# Patient Record
Sex: MALE | Race: WHITE | NOT HISPANIC OR LATINO | Employment: FULL TIME | URBAN - METROPOLITAN AREA
[De-identification: names, ages, dates, MRNs, and addresses within clinical notes are randomized per-mention and may not be internally consistent; named-entity substitution may affect disease eponyms.]

---

## 2020-07-22 ENCOUNTER — HOSPITAL ENCOUNTER (OUTPATIENT)
Dept: PERIOP | Facility: HOSPITAL | Age: 40
Setting detail: HOSPITAL OUTPATIENT SURGERY
Discharge: HOME | End: 2020-07-22
Attending: UROLOGY

## 2020-07-22 LAB
ALBUMIN SERPL-MCNC: 4.1 G/DL (ref 3.5–5)
ALBUMIN/GLOB SERPL: 1.3 {RATIO} (ref 1.4–2.6)
ALP SERPL-CCNC: 65 U/L (ref 53–128)
ALT SERPL-CCNC: 55 U/L (ref 10–40)
ANION GAP SERPL CALC-SCNC: 18 MMOL/L (ref 8–19)
APPEARANCE UR: ABNORMAL
AST SERPL-CCNC: 38 U/L (ref 15–50)
BASOPHILS # BLD AUTO: 0.02 10*3/UL (ref 0–0.2)
BASOPHILS NFR BLD AUTO: 0.4 % (ref 0–3)
BILIRUB SERPL-MCNC: 0.47 MG/DL (ref 0.2–1.3)
BILIRUB UR QL: ABNORMAL
BUN SERPL-MCNC: 13 MG/DL (ref 5–25)
BUN/CREAT SERPL: 12 {RATIO} (ref 6–20)
CALCIUM SERPL-MCNC: 9.1 MG/DL (ref 8.7–10.4)
CHLORIDE SERPL-SCNC: 99 MMOL/L (ref 99–111)
COLOR UR: ABNORMAL
CONV ABS IMM GRAN: 0.01 10*3/UL (ref 0–0.2)
CONV BACTERIA: NEGATIVE
CONV CO2: 23 MMOL/L (ref 22–32)
CONV COLLECTION SOURCE (UA): ABNORMAL
CONV HYALINE CASTS IN URINE MICRO: ABNORMAL /[LPF]
CONV IMMATURE GRAN: 0.2 % (ref 0–1.8)
CONV TOTAL PROTEIN: 7.2 G/DL (ref 6.3–8.2)
CONV UROBILINOGEN IN URINE BY AUTOMATED TEST STRIP: 1 {EHRLICHU}/DL (ref 0.1–1)
CREAT UR-MCNC: 1.11 MG/DL (ref 0.7–1.2)
DEPRECATED RDW RBC AUTO: 41.3 FL (ref 35.1–43.9)
EOSINOPHIL # BLD AUTO: 0.09 10*3/UL (ref 0–0.7)
EOSINOPHIL # BLD AUTO: 2 % (ref 0–7)
ERYTHROCYTE [DISTWIDTH] IN BLOOD BY AUTOMATED COUNT: 12.4 % (ref 11.6–14.4)
GFR SERPLBLD BASED ON 1.73 SQ M-ARVRAT: >60 ML/MIN/{1.73_M2}
GLOBULIN UR ELPH-MCNC: 3.1 G/DL (ref 2–3.5)
GLUCOSE SERPL-MCNC: 126 MG/DL (ref 70–99)
GLUCOSE UR QL: NEGATIVE MG/DL
HCT VFR BLD AUTO: 41.9 % (ref 42–52)
HGB BLD-MCNC: 14.6 G/DL (ref 14–18)
HGB UR QL STRIP: ABNORMAL
KETONES UR QL STRIP: NEGATIVE MG/DL
LEUKOCYTE ESTERASE UR QL STRIP: ABNORMAL
LIPASE SERPL-CCNC: 35 U/L (ref 5–51)
LYMPHOCYTES # BLD AUTO: 0.98 10*3/UL (ref 1–5)
LYMPHOCYTES NFR BLD AUTO: 21.9 % (ref 20–45)
MCH RBC QN AUTO: 31.5 PG (ref 27–31)
MCHC RBC AUTO-ENTMCNC: 34.8 G/DL (ref 33–37)
MCV RBC AUTO: 90.3 FL (ref 80–96)
MONOCYTES # BLD AUTO: 0.41 10*3/UL (ref 0.2–1.2)
MONOCYTES NFR BLD AUTO: 9.2 % (ref 3–10)
NEUTROPHILS # BLD AUTO: 2.97 10*3/UL (ref 2–8)
NEUTROPHILS NFR BLD AUTO: 66.3 % (ref 30–85)
NITRITE UR QL STRIP: NEGATIVE
NRBC CBCN: 0 % (ref 0–0.7)
OSMOLALITY SERPL CALC.SUM OF ELEC: 286 MOSM/KG (ref 273–304)
PH UR STRIP.AUTO: 5 [PH] (ref 5–8)
PLATELET # BLD AUTO: 181 10*3/UL (ref 130–400)
PMV BLD AUTO: 11.3 FL (ref 9.4–12.4)
POTASSIUM SERPL-SCNC: 3.4 MMOL/L (ref 3.5–5.3)
PROT UR QL: 100 MG/DL
RBC # BLD AUTO: 4.64 10*6/UL (ref 4.7–6.1)
RBC #/AREA URNS HPF: ABNORMAL /[HPF]
SODIUM SERPL-SCNC: 137 MMOL/L (ref 135–147)
SP GR UR: 1.06 (ref 1–1.03)
WBC # BLD AUTO: 4.48 10*3/UL (ref 4.8–10.8)
WBC #/AREA URNS HPF: ABNORMAL /[HPF]

## 2020-08-11 LAB
COLOR STONE: NORMAL
COMPN STONE: NORMAL
CONV CA OXALATE DIHYDRATE: 40 %
CONV CA OXALATE MONOHYDRATE: 55 %
CONV CALCULI COMMENT: NORMAL
CONV CALCULI DISCLAIMER: NORMAL
CONV CALCULI NOTE: NORMAL
CONV PHOTO (CALCULI): NORMAL
HYDROXYAPATITE: 5 %
SIZE STONE: NORMAL MM
WT STONE: 42 MG

## 2020-08-24 ENCOUNTER — TELEPHONE CONVERTED (OUTPATIENT)
Dept: UROLOGY | Facility: CLINIC | Age: 40
End: 2020-08-24
Attending: UROLOGY

## 2021-04-15 ENCOUNTER — OFFICE VISIT (OUTPATIENT)
Dept: CARDIOLOGY | Facility: CLINIC | Age: 41
End: 2021-04-15

## 2021-04-15 VITALS
DIASTOLIC BLOOD PRESSURE: 78 MMHG | SYSTOLIC BLOOD PRESSURE: 139 MMHG | HEART RATE: 90 BPM | WEIGHT: 255 LBS | BODY MASS INDEX: 38.65 KG/M2 | HEIGHT: 68 IN | OXYGEN SATURATION: 94 %

## 2021-04-15 DIAGNOSIS — R07.2 PRECORDIAL PAIN: Primary | ICD-10-CM

## 2021-04-15 DIAGNOSIS — I10 ESSENTIAL HYPERTENSION: ICD-10-CM

## 2021-04-15 DIAGNOSIS — R06.02 SHORTNESS OF BREATH: ICD-10-CM

## 2021-04-15 PROCEDURE — 99204 OFFICE O/P NEW MOD 45 MIN: CPT | Performed by: PHYSICIAN ASSISTANT

## 2021-04-15 RX ORDER — LIRAGLUTIDE 6 MG/ML
1.2 INJECTION SUBCUTANEOUS DAILY
COMMUNITY
End: 2023-01-05

## 2021-04-15 RX ORDER — DIPHENOXYLATE HYDROCHLORIDE AND ATROPINE SULFATE 2.5; .025 MG/1; MG/1
TABLET ORAL DAILY
COMMUNITY

## 2021-04-15 RX ORDER — ATORVASTATIN CALCIUM 10 MG/1
10 TABLET, FILM COATED ORAL DAILY
COMMUNITY

## 2021-04-15 RX ORDER — HYDROCHLOROTHIAZIDE 25 MG/1
25 TABLET ORAL DAILY
COMMUNITY

## 2021-04-15 RX ORDER — LISINOPRIL 5 MG/1
5 TABLET ORAL DAILY
COMMUNITY

## 2021-04-15 RX ORDER — LIRAGLUTIDE 6 MG/ML
0.6 INJECTION SUBCUTANEOUS DAILY
COMMUNITY
End: 2023-01-05

## 2021-04-15 NOTE — PROGRESS NOTES
Bekah Blue is a 40 y.o. male     Chief Complaint   Patient presents with   • Establish Care     [facility] recent D/C       HPI  The patient presents to the clinic today to establish cardiovascular care.  This very pleasant patient is referred in the setting of chest pain, left upper extremity discomfort, and dyspnea.  He had a recent episode of onset of chest discomfort which he describes as intense precordial chest tightness and aching.  There was associated left upper extremity numbness and pain otherwise.  He initially felt that his chest discomfort was reflux and his left upper extremity discomfort was more radicular from his known cervical disc disease.  Still, with severe chest discomfort and progressively worsening dyspnea throughout the day, the patient eventually went to the emergency room locally.  Cardiac enzymes, laboratories otherwise, telemetry, etc. all were benign.  He was ruled out for MI and eventually discharged and advised to follow-up in the clinic today.  He was started on anxiolytic therapy apparently with his primary care provider.  Since, he tells me that his chest pain has progressively minimized.  Still, because of severity of symptoms previously, he would like to proceed on with cardiac testing just to ensure no significant abnormalities.  Otherwise, the patient has experienced no failure or dysrhythmic symptoms.  Blood pressures appear to be well controlled on current medical regimen.  He has no further complaints otherwise.      Current Outpatient Medications   Medication Sig Dispense Refill   • APPLE CIDER VINEGAR PO Take  by mouth 2 (two) times a day.     • atorvastatin (LIPITOR) 10 MG tablet Take 10 mg by mouth Daily.     • hydroCHLOROthiazide (HYDRODIURIL) 25 MG tablet Take 25 mg by mouth Daily.     • Liraglutide (Victoza) 18 MG/3ML solution pen-injector injection Inject 0.6 mg under the skin into the appropriate area as directed Daily. For the first week then increase to  1.2 mg daily     • Liraglutide (Victoza) 18 MG/3ML solution pen-injector injection Inject 1.2 mg under the skin into the appropriate area as directed Daily.     • lisinopril (PRINIVIL,ZESTRIL) 5 MG tablet Take 5 mg by mouth Daily.     • multivitamin (MULTI-DAY PO) Take  by mouth Daily.     • sertraline (ZOLOFT) 50 MG tablet Take 50 mg by mouth Daily.       No current facility-administered medications for this visit.       Patient has no known allergies.    Past Medical History:   Diagnosis Date   • Hyperlipidemia    • Hypertension        Social History     Socioeconomic History   • Marital status:      Spouse name: Not on file   • Number of children: Not on file   • Years of education: Not on file   • Highest education level: Not on file   Tobacco Use   • Smoking status: Former Smoker   • Smokeless tobacco: Never Used   Substance and Sexual Activity   • Alcohol use: Yes     Comment: social    • Drug use: Never   • Sexual activity: Defer       Family History   Problem Relation Age of Onset   • Hypertension Mother    • Heart disease Mother    • Cancer Father        Review of Systems   Constitutional: Negative.  Negative for chills, fatigue and fever.   HENT: Negative.  Negative for congestion, rhinorrhea and sore throat.    Eyes: Positive for visual disturbance (glasses).   Respiratory: Positive for chest tightness. Negative for shortness of breath and wheezing.    Cardiovascular: Positive for palpitations and leg swelling. Negative for chest pain.   Gastrointestinal: Negative.    Endocrine: Negative.  Negative for cold intolerance.   Genitourinary: Negative.    Musculoskeletal: Positive for back pain and neck pain. Negative for arthralgias.   Skin: Negative.  Negative for rash and wound.   Allergic/Immunologic: Positive for environmental allergies.   Neurological: Positive for numbness (L/ R arm at times ). Negative for dizziness, weakness and headaches.   Hematological: Negative.  Does not bruise/bleed  "easily.   Psychiatric/Behavioral: Positive for sleep disturbance (falling asleep ).       Objective     Vitals:    04/15/21 1504   BP: 139/78   BP Location: Left arm   Patient Position: Sitting   Pulse: 90   SpO2: 94%   Weight: 116 kg (255 lb)   Height: 172.7 cm (68\")        /78 (BP Location: Left arm, Patient Position: Sitting)   Pulse 90   Ht 172.7 cm (68\")   Wt 116 kg (255 lb)   SpO2 94%   BMI 38.77 kg/m²      Lab Results (most recent)     None          Physical Exam  Vitals and nursing note reviewed.   Constitutional:       General: He is not in acute distress.     Appearance: He is well-developed.   HENT:      Head: Normocephalic and atraumatic.   Eyes:      Conjunctiva/sclera: Conjunctivae normal.      Pupils: Pupils are equal, round, and reactive to light.   Neck:      Vascular: No JVD.      Trachea: No tracheal deviation.   Cardiovascular:      Rate and Rhythm: Normal rate and regular rhythm.      Heart sounds: Normal heart sounds.   Pulmonary:      Effort: Pulmonary effort is normal.      Breath sounds: Normal breath sounds.   Abdominal:      General: Bowel sounds are normal. There is no distension.      Palpations: Abdomen is soft. There is no mass.      Tenderness: There is no abdominal tenderness. There is no guarding or rebound.   Musculoskeletal:         General: No tenderness or deformity. Normal range of motion.      Cervical back: Normal range of motion and neck supple.   Skin:     General: Skin is warm and dry.      Coloration: Skin is not pale.      Findings: No erythema or rash.   Neurological:      Mental Status: He is alert and oriented to person, place, and time.   Psychiatric:         Behavior: Behavior normal.         Thought Content: Thought content normal.         Judgment: Judgment normal.         Procedure   Procedures         Assessment/Plan      Diagnosis Plan   1. Precordial pain  Adult Transthoracic Echo Complete W/ Cont if Necessary Per Protocol    Treadmill Stress Test "   2. Shortness of breath  Adult Transthoracic Echo Complete W/ Cont if Necessary Per Protocol    Treadmill Stress Test   3. Essential hypertension  Adult Transthoracic Echo Complete W/ Cont if Necessary Per Protocol    Treadmill Stress Test     1.  The patient overall describes atypical chest discomfort.  He feels that his symptoms have improved since starting anxiolytic therapy.  Irregardless, because of severity and intensity of his chest discomfort, which prompted recent ER evaluation as above, I would like to schedule him for further testing.  I will schedule for regular treadmill stress test for ischemia assessment.    2.  I will schedule for an echocardiogram as well to evaluate him structurally given symptoms as above.    3.  The patient appears to be normotensive for the most part at this time.  I would continue antihypertensive therapy without change.  He will monitor blood pressures closely and call to us for any issues.    4.  We will make no further adjustments in medical regimen.  We will see him back after we have the above studies available.  We can recommend him further at that time.  He will call for any issues prior to follow-up.           Gabino Blue  reports that he has quit smoking. He has never used smokeless tobacco..          Patient's Body mass index is 38.77 kg/m². BMI is above normal parameters. Recommendations include: educational material.           Electronically signed by:

## 2021-04-15 NOTE — PATIENT INSTRUCTIONS

## 2021-05-10 NOTE — H&P
"   History and Physical      Patient Name: Gabino Blue   Patient ID: [id number]   Sex: Male   YOB: 1980        Visit Date: August 24, 2020    Provider: Rj Samano MD   Location: [facility]   Location Address: [address] [place] [address]  [id number]   Location Phone: [phone]          Chief Complaint  · pt here for urologic issues      History Of Present Illness  TELEHEALTH TELEPHONE VISIT  Gabino Blue is a 39 year old male who is presenting for evaluation via telehealth telephone visit. Verbal consent obtained before beginning visit.   Provider spent 13 minutes with the patient during the telehealth visit.   The following staff were present during this visit: Ana Adams   Past Medical History/ Overview of Patient Symptoms     39-year-old gentleman status post ureteroscopy    Asymptomatic.  No gross hematuria    7/22/2020 right ureteroscopy with laser and stent -all stones removed    7/20 calcium oxalate 40%, calcium oxalate monohydrate 55%, hydroxyapatite 5%    7/20 CTno stones on the left.    24 hour urine done in set by another urologist, no medicine was prescribed at that time    Stent is out    3 stones, 2 lithotripsies    No cardiopulmonary history.  Patient does not smoke.  Patient does not use blood thinner.             Past Medical History  Hypertension; Kidney calculus         Past Surgical History  Ear Tubes; Kidney_Surgery; Knee surgery         Medication List  Allergy Medication; hydrochlorothiazide 25 mg oral tablet         Allergy List  NO KNOWN DRUG ALLERGIES         Family Medical History  *No Known Family History         Social History  Tobacco (Never)         Review of Systems  · Constitutional  o Denies  o : chills  · Gastrointestinal  o Denies  o : vomiting      Vitals  Date Time BP Position Site L\R Cuff Size HR RR TEMP (F) WT  HT  BMI kg/m2 BSA m2 O2 Sat HC       08/24/2020 09:56 AM         249lbs 0oz 5'  8\" 37.86 2.33   "             Assessment  · Nephrolithiasis     592.0/N20.0    Problems Reconciled  Plan  · Orders  o Physician Telephone Evaluation, 11-20 minutes (51442) - 592.0/N20.0 - 08/24/2020  · Medications  o Medications have been Reconciled  o Transition of Care or Provider Policy  · Instructions  o Plan Of Care:   o Electronically Identified Patient Education Materials Provided Electronically       The patient was counseled on the preventative measures of kidney stones today.  This included increasing fluid intake to make at least 1.5 ml daily, decreasing meat intake, decreasing salt intake and taking in a normal amount of calcium (1000 mg daily).      Not interested in metabolic stone work-up    Follow-up as needed             Electronically Signed by: Rj Samano MD -Author on August 24, 2020 10:18:53 AM

## 2021-05-14 VITALS — BODY MASS INDEX: 37.74 KG/M2 | HEIGHT: 68 IN | WEIGHT: 249 LBS

## 2021-06-15 ENCOUNTER — HOSPITAL ENCOUNTER (OUTPATIENT)
Dept: CARDIOLOGY | Facility: HOSPITAL | Age: 41
Discharge: HOME | End: 2021-06-15

## 2021-06-15 VITALS — BODY MASS INDEX: 38.76 KG/M2 | HEIGHT: 68 IN | WEIGHT: 255.73 LBS

## 2021-06-15 DIAGNOSIS — R06.02 SHORTNESS OF BREATH: ICD-10-CM

## 2021-06-15 DIAGNOSIS — R07.2 PRECORDIAL PAIN: ICD-10-CM

## 2021-06-15 DIAGNOSIS — I10 ESSENTIAL HYPERTENSION: ICD-10-CM

## 2021-06-15 PROCEDURE — 93017 CV STRESS TEST TRACING ONLY: CPT

## 2021-06-15 PROCEDURE — 93306 TTE W/DOPPLER COMPLETE: CPT

## 2021-06-15 PROCEDURE — 93306 TTE W/DOPPLER COMPLETE: CPT | Performed by: INTERNAL MEDICINE

## 2021-06-15 PROCEDURE — 93018 CV STRESS TEST I&R ONLY: CPT | Performed by: INTERNAL MEDICINE

## 2021-06-20 LAB
BH CV STRESS RECOVERY BP: NORMAL MMHG
BH CV STRESS RECOVERY HR: 98 BPM
MAXIMAL PREDICTED HEART RATE: 180 BPM
PERCENT MAX PREDICTED HR: 92.22 %
STRESS BASELINE BP: NORMAL MMHG
STRESS BASELINE HR: 60 BPM
STRESS PERCENT HR: 108 %
STRESS POST ESTIMATED WORKLOAD: 12 METS
STRESS POST EXERCISE DUR MIN: 10 MIN
STRESS POST EXERCISE DUR SEC: 15 SEC
STRESS POST PEAK BP: NORMAL MMHG
STRESS POST PEAK HR: 166 BPM
STRESS TARGET HR: 153 BPM

## 2021-06-22 ENCOUNTER — TELEPHONE (OUTPATIENT)
Dept: CARDIOLOGY | Facility: CLINIC | Age: 41
End: 2021-06-22

## 2021-06-22 NOTE — TELEPHONE ENCOUNTER
Left VM for patient - stress test     Heart pump function good   Resting heart rate good  No indication of blockages     Keep routine follow up     Patient did report SOB during test - just checking to see how he is doing / if he is having any other ongoing symptoms     Please call with any questions / concerns     AT [facility]        ----- Message from JEN Queen sent at 6/21/2021  8:37 AM EDT -----  Routine follow-up.  Please let me know for ongoing symptoms.

## 2021-06-27 LAB
AORTIC DIMENSIONLESS INDEX: 0.9 (DI)
BH CV ECHO MEAS - ACS: 2 CM
BH CV ECHO MEAS - AO MAX PG (FULL): 1.7 MMHG
BH CV ECHO MEAS - AO MAX PG: 7.4 MMHG
BH CV ECHO MEAS - AO MEAN PG (FULL): 1 MMHG
BH CV ECHO MEAS - AO MEAN PG: 4 MMHG
BH CV ECHO MEAS - AO ROOT AREA (BSA CORRECTED): 1.2
BH CV ECHO MEAS - AO ROOT AREA: 5.9 CM^2
BH CV ECHO MEAS - AO ROOT DIAM: 2.8 CM
BH CV ECHO MEAS - AO V2 MAX: 136 CM/SEC
BH CV ECHO MEAS - AO V2 MEAN: 95 CM/SEC
BH CV ECHO MEAS - AO V2 VTI: 33.5 CM
BH CV ECHO MEAS - BSA(HAYCOCK): 2.4 M^2
BH CV ECHO MEAS - BSA: 2.3 M^2
BH CV ECHO MEAS - BZI_BMI: 38.8 KILOGRAMS/M^2
BH CV ECHO MEAS - BZI_METRIC_HEIGHT: 172.7 CM
BH CV ECHO MEAS - BZI_METRIC_WEIGHT: 115.7 KG
BH CV ECHO MEAS - EDV(CUBED): 137.4 ML
BH CV ECHO MEAS - EDV(TEICH): 127.2 ML
BH CV ECHO MEAS - EF(CUBED): 68.8 %
BH CV ECHO MEAS - EF(TEICH): 60 %
BH CV ECHO MEAS - EF_3D-VOL: 54 %
BH CV ECHO MEAS - ESV(CUBED): 42.9 ML
BH CV ECHO MEAS - ESV(TEICH): 50.9 ML
BH CV ECHO MEAS - FS: 32.2 %
BH CV ECHO MEAS - IVS/LVPW: 0.81
BH CV ECHO MEAS - IVSD: 0.83 CM
BH CV ECHO MEAS - LA A2CS (ATRIAL LENGTH): 5.4 CM
BH CV ECHO MEAS - LA DIMENSION: 3.7 CM
BH CV ECHO MEAS - LA/AO: 1.3
BH CV ECHO MEAS - LAT PEAK E' VEL: 11.9 CM/SEC
BH CV ECHO MEAS - LV IVRT: 0.13 SEC
BH CV ECHO MEAS - LV MASS(C)D: 174.4 GRAMS
BH CV ECHO MEAS - LV MASS(C)DI: 76.9 GRAMS/M^2
BH CV ECHO MEAS - LV MAX PG: 5.7 MMHG
BH CV ECHO MEAS - LV MEAN PG: 3 MMHG
BH CV ECHO MEAS - LV V1 MAX: 119 CM/SEC
BH CV ECHO MEAS - LV V1 MEAN: 74.4 CM/SEC
BH CV ECHO MEAS - LV V1 VTI: 27.2 CM
BH CV ECHO MEAS - LVIDD: 5.2 CM
BH CV ECHO MEAS - LVIDS: 3.5 CM
BH CV ECHO MEAS - LVPWD: 1 CM
BH CV ECHO MEAS - MED PEAK E' VEL: 12.1 CM/SEC
BH CV ECHO MEAS - MV A MAX VEL: 56.4 CM/SEC
BH CV ECHO MEAS - MV DEC SLOPE: 302 CM/SEC^2
BH CV ECHO MEAS - MV DEC TIME: 0.25 SEC
BH CV ECHO MEAS - MV E MAX VEL: 84.8 CM/SEC
BH CV ECHO MEAS - MV E/A: 1.5
BH CV ECHO MEAS - MV MAX PG: 4 MMHG
BH CV ECHO MEAS - MV MEAN PG: 1 MMHG
BH CV ECHO MEAS - MV P1/2T MAX VEL: 81.1 CM/SEC
BH CV ECHO MEAS - MV P1/2T: 78.7 MSEC
BH CV ECHO MEAS - MV V2 MAX: 100 CM/SEC
BH CV ECHO MEAS - MV V2 MEAN: 55.1 CM/SEC
BH CV ECHO MEAS - MV V2 VTI: 42.4 CM
BH CV ECHO MEAS - MVA P1/2T LCG: 2.7 CM^2
BH CV ECHO MEAS - MVA(P1/2T): 2.8 CM^2
BH CV ECHO MEAS - PA MAX PG (FULL): 2.1 MMHG
BH CV ECHO MEAS - PA MAX PG: 3.8 MMHG
BH CV ECHO MEAS - PA MEAN PG (FULL): 1 MMHG
BH CV ECHO MEAS - PA MEAN PG: 2 MMHG
BH CV ECHO MEAS - PA V2 MAX: 97.4 CM/SEC
BH CV ECHO MEAS - PA V2 MEAN: 65.4 CM/SEC
BH CV ECHO MEAS - PA V2 VTI: 22.2 CM
BH CV ECHO MEAS - RAP SYSTOLE: 10 MMHG
BH CV ECHO MEAS - RV MAX PG: 1.7 MMHG
BH CV ECHO MEAS - RV MEAN PG: 1 MMHG
BH CV ECHO MEAS - RV V1 MAX: 65.6 CM/SEC
BH CV ECHO MEAS - RV V1 MEAN: 43.9 CM/SEC
BH CV ECHO MEAS - RV V1 VTI: 17.1 CM
BH CV ECHO MEAS - RVDD: 2.7 CM
BH CV ECHO MEAS - RVSP: 16.3 MMHG
BH CV ECHO MEAS - SI(AO): 87.8 ML/M^2
BH CV ECHO MEAS - SI(CUBED): 41.7 ML/M^2
BH CV ECHO MEAS - SI(TEICH): 33.7 ML/M^2
BH CV ECHO MEAS - SV(AO): 199 ML
BH CV ECHO MEAS - SV(CUBED): 94.5 ML
BH CV ECHO MEAS - SV(TEICH): 76.3 ML
BH CV ECHO MEAS - TAPSE (>1.6): 2.3 CM
BH CV ECHO MEAS - TR MAX VEL: 125 CM/SEC
BH CV ECHO MEASUREMENTS AVERAGE E/E' RATIO: 7.07
LEFT ATRIUM VOLUME INDEX: 19 ML/M2
LEFT ATRIUM VOLUME: 43 CM3
MAXIMAL PREDICTED HEART RATE: 180 BPM
STRESS TARGET HR: 153 BPM

## 2021-06-28 ENCOUNTER — TELEPHONE (OUTPATIENT)
Dept: CARDIOLOGY | Facility: CLINIC | Age: 41
End: 2021-06-28

## 2021-06-28 NOTE — TELEPHONE ENCOUNTER
Patient informed of results and will keep follow up as scheduled. Leeanna Bowie MA      ----- Message from JEN Queen sent at 6/28/2021  8:43 AM EDT -----  Routine follow-up.       Result Text  Borderline technically adequate study felt suitable for interpretation.     1. LV size, function, and wall motion are normal. LV wall thickness is at the upper limits of normal. Visually estimated ejection fraction is 50 to 55%. 3D ejection fraction is 54%. Normal LV diastolic function and filling pressures. Left atrium is at the upper limits of normal size. Right heart chambers are grossly normal. No septal defect or intracavitary mass or thrombus.     2. Valves are morphologically and physiologically normal.     3. No pericardial or great vessel pathology.     4. Pulmonary artery pressures cannot be calculated.

## 2022-11-07 ENCOUNTER — TELEPHONE (OUTPATIENT)
Dept: CARDIOLOGY | Facility: CLINIC | Age: 42
End: 2022-11-07

## 2022-11-07 NOTE — TELEPHONE ENCOUNTER
The PM called the pt numerous times to schedule an appt.  No answer, no return call after LVM.    The PM called and spoke with Serena MCCABE At the pt's PCP office.  He will call the pt and verify if he wants to schedule at our office or transition his care since there was some confusion regarding the referral.

## 2023-01-05 ENCOUNTER — OFFICE VISIT (OUTPATIENT)
Dept: CARDIOLOGY | Facility: CLINIC | Age: 43
End: 2023-01-05
Payer: COMMERCIAL

## 2023-01-05 VITALS
OXYGEN SATURATION: 95 % | BODY MASS INDEX: 37.07 KG/M2 | WEIGHT: 244.6 LBS | DIASTOLIC BLOOD PRESSURE: 82 MMHG | HEIGHT: 68 IN | SYSTOLIC BLOOD PRESSURE: 133 MMHG | HEART RATE: 86 BPM

## 2023-01-05 DIAGNOSIS — R06.83 SNORES: Primary | ICD-10-CM

## 2023-01-05 DIAGNOSIS — I10 ESSENTIAL HYPERTENSION: ICD-10-CM

## 2023-01-05 DIAGNOSIS — R53.83 FATIGUE, UNSPECIFIED TYPE: ICD-10-CM

## 2023-01-05 DIAGNOSIS — R00.2 PALPITATIONS: ICD-10-CM

## 2023-01-05 DIAGNOSIS — R55 SYNCOPE AND COLLAPSE: ICD-10-CM

## 2023-01-05 DIAGNOSIS — R40.0 DAYTIME SLEEPINESS: ICD-10-CM

## 2023-01-05 PROCEDURE — 99214 OFFICE O/P EST MOD 30 MIN: CPT | Performed by: NURSE PRACTITIONER

## 2023-01-05 PROCEDURE — 93000 ELECTROCARDIOGRAM COMPLETE: CPT | Performed by: NURSE PRACTITIONER

## 2023-01-05 RX ORDER — LANOLIN ALCOHOL/MO/W.PET/CERES
400 CREAM (GRAM) TOPICAL DAILY
COMMUNITY

## 2023-01-05 RX ORDER — CYCLOBENZAPRINE HCL 5 MG
5 TABLET ORAL DAILY
COMMUNITY

## 2023-01-05 NOTE — PROGRESS NOTES
Subjective     Gabino Blue is a 42 y.o. male who presents to day for Hypertension, Chronic Venous Insufficiency, and Rapid Heart Rate.    CHIEF COMPLIANT  Chief Complaint   Patient presents with   • Hypertension   • Chronic Venous Insufficiency   • Rapid Heart Rate       Active Problems:  Problem List Items Addressed This Visit    None  Visit Diagnoses     Snores    -  Primary    Relevant Orders    Home Sleep Study    Cardiac Event Monitor    Essential hypertension        Relevant Orders    Cardiac Event Monitor    Syncope and collapse        Relevant Orders    Cardiac Event Monitor    Daytime sleepiness        Relevant Orders    Home Sleep Study    Cardiac Event Monitor    Fatigue, unspecified type        Relevant Orders    Home Sleep Study    Cardiac Event Monitor    Palpitations        Relevant Orders    Cardiac Event Monitor      Problem List :  1.Precordial pain  2.Shortness of breath  3.Essential hypertension    HPI  Hypertension  Associated symptoms include neck pain (C7 C3 injury) and palpitations (racing ). Pertinent negatives include no chest pain or shortness of breath.     Gabino Blue is a 42-year-old male who is being followed up today for chronic arterial hypertension in which he is being treated with lisinopril. Today's blood pressure is controlled at 133/82 mmHg with a heart rate of 86 beats per minute. Also being followed up today for palpitations in which his heart races; he is on no beta blocker therapy.     He also had an episode of syncope that occurred 3 weeks ago due to reported neck injury. The patient's electrocardiogram is comparable to his past electrocardiogram. It is a normal axis. The patient believes that his injury is in C7 and C3. He states that he has been evaluated for this previously. He states that he is able to stretch and then he comes dizzy. This has occurred previously. The patient states that he was told the pinched nerves in his neck cause this. He was told that this  reduces his oxygen, and he is able to feel tingling before he experiences syncope.    The patient has experienced palpitations and racing heart. This has been more recently than 1 year, and he has a log and a monitor. His log shows a heart rate of 200 beats per minute at an occurrence. The patient states that he does not often have a low heart rate. His lowest measurement was 42 beats per minute. His average resting heart rate is 61 to 81 beats per minute.    The patient was infected with influenza approximately 2 weeks ago. The patient states that his monitor alerts him of a racing heart rate, and he is able to feel symptoms as well. His monitor alerted him before, during, and after his illness period. This prompted him to make an appointment. The patient states that he has been infected with COVID-19 4 times.    The patient does not believe the syncope is a cardiac issue. He is able to feel pressure between his shoulders.    The patient will be seen by his primary care provider, DONTE Hayes on 01/10/2023. He will have blood work done.  The patient has diabetes mellitus. The patient states that his blood sugar would lower rather than elevate when utilizing the Dexcom.  He does snore. He states that he does not feel like he sleeps enough with any sleep schedule. He feels restless. He has tried taking multivitamins for this. He does not believe that he wakes up gasping for air. He been told that he stops breathing when he sleeps. He feels fatigue.    The patient states that he has lost weight. The patient utilizes Trulicity 1.5 mg/0.5 mL once weekly. He hopes to lose more weight. His goal weight is 210 pounds. He initially weighed 256 pounds.    The patient takes hydrochlorothiazide. The patient states that he was also taking Zoloft, but he discontinued this on his own. He states that he has been feeling well since discontinuing this medication. He has not been taking it for approximately 2 months.    PRIOR  MEDS  Current Outpatient Medications on File Prior to Visit   Medication Sig Dispense Refill   • atorvastatin (LIPITOR) 10 MG tablet Take 10 mg by mouth Daily.     • cyclobenzaprine (FLEXERIL) 5 MG tablet Take 5 mg by mouth Daily.     • DICLOFENAC PO Take 50 mg by mouth 2 (Two) Times a Day As Needed.     • Dulaglutide 1.5 MG/0.5ML solution pen-injector Inject 1.5 mg under the skin into the appropriate area as directed 1 (One) Time Per Week.     • hydroCHLOROthiazide (HYDRODIURIL) 25 MG tablet Take 25 mg by mouth Daily.     • lisinopril (PRINIVIL,ZESTRIL) 5 MG tablet Take 5 mg by mouth Daily.     • Magnesium Oxide 400 (240 Mg) MG tablet Take 400 mg by mouth Daily.     • multivitamin (THERAGRAN) tablet tablet Take  by mouth Daily.     • [DISCONTINUED] APPLE CIDER VINEGAR PO Take  by mouth 2 (two) times a day.     • [DISCONTINUED] Liraglutide (Victoza) 18 MG/3ML solution pen-injector injection Inject 0.6 mg under the skin into the appropriate area as directed Daily. For the first week then increase to 1.2 mg daily     • [DISCONTINUED] Liraglutide (Victoza) 18 MG/3ML solution pen-injector injection Inject 1.2 mg under the skin into the appropriate area as directed Daily.     • [DISCONTINUED] sertraline (ZOLOFT) 50 MG tablet Take 50 mg by mouth Daily.       No current facility-administered medications on file prior to visit.       ALLERGIES  Patient has no known allergies.    HISTORY  Past Medical History:   Diagnosis Date   • Hyperlipidemia    • Hypertension        Social History     Socioeconomic History   • Marital status:    Tobacco Use   • Smoking status: Former   • Smokeless tobacco: Never   Substance and Sexual Activity   • Alcohol use: Yes     Comment: social    • Drug use: Never   • Sexual activity: Defer       Family History   Problem Relation Age of Onset   • Hypertension Mother    • Heart disease Mother    • Cancer Father        Review of Systems   Constitutional: Negative for chills, fatigue and  fever.   HENT: Negative for congestion, rhinorrhea and sore throat.    Respiratory: Negative for chest tightness and shortness of breath.    Cardiovascular: Positive for palpitations (racing ). Negative for chest pain and leg swelling.   Gastrointestinal: Negative for constipation, diarrhea and nausea.   Musculoskeletal: Positive for arthralgias (wrists knees) and neck pain (C7 C3 injury). Negative for back pain.   Allergic/Immunologic: Positive for environmental allergies. Negative for food allergies.   Neurological: Positive for dizziness (getting up to quick), syncope (3 weeks ago thinks its due to neck injury), weakness (related to DM) and light-headedness.   Hematological: Does not bruise/bleed easily.   Psychiatric/Behavioral: Positive for sleep disturbance (not sleeping well would like sleep study).       Objective     VITALS: /82 (BP Location: Left arm, Patient Position: Sitting, Cuff Size: Adult)   Pulse 86   Ht 172.7 cm (68\")   Wt 111 kg (244 lb 9.6 oz)   SpO2 95%   BMI 37.19 kg/m²     LABS:   Lab Results (most recent)     None          IMAGING:   No Images in the past 120 days found..    EXAM:  Physical Exam  Vitals and nursing note reviewed.   Constitutional:       Appearance: He is well-developed.   HENT:      Head: Atraumatic.   Eyes:      Pupils: Pupils are equal, round, and reactive to light.   Neck:      Vascular: No carotid bruit or JVD.   Cardiovascular:      Rate and Rhythm: Normal rate and regular rhythm.      Pulses:           Carotid pulses are 2+ on the right side and 2+ on the left side.       Radial pulses are 2+ on the right side and 2+ on the left side.        Posterior tibial pulses are 2+ on the right side and 2+ on the left side.      Heart sounds: Normal heart sounds. No murmur heard.    No gallop.   Pulmonary:      Effort: Pulmonary effort is normal. No respiratory distress.      Breath sounds: Normal breath sounds.   Abdominal:      General: Bowel sounds are normal. There  is no distension.      Palpations: Abdomen is soft.      Tenderness: There is no abdominal tenderness.   Musculoskeletal:         General: No swelling. Normal range of motion.      Cervical back: Neck supple.   Skin:     General: Skin is warm and dry.   Neurological:      Mental Status: He is alert and oriented to person, place, and time.      Cranial Nerves: No cranial nerve deficit.      Sensory: No sensory deficit.   Psychiatric:         Speech: Speech normal.         Behavior: Behavior normal.         Thought Content: Thought content normal.         Judgment: Judgment normal.         Procedure     ECG 12 Lead    Date/Time: 1/5/2023 4:08 PM  Performed by: Dane Hodge APRN  Authorized by: Dane Hodge APRN   Comparison: compared with previous ECG from 3/22/2021  Comparison to previous ECG: Previously left axis  Rhythm: sinus rhythm  Rate: normal  BPM: 93  QRS axis: normal  Other findings: non-specific ST-T wave changes  Comments:  ms  No acute changes               Assessment & Plan    Diagnosis Plan   1. Snores  Home Sleep Study    Cardiac Event Monitor      2. Essential hypertension  Cardiac Event Monitor      3. Syncope and collapse  Cardiac Event Monitor      4. Daytime sleepiness  Home Sleep Study    Cardiac Event Monitor      5. Fatigue, unspecified type  Home Sleep Study    Cardiac Event Monitor      6. Palpitations  Cardiac Event Monitor      1. The patient will have an at-home sleep study done to evaluate for obstructive sleep apnea as a potential cause of his daytime sleepiness, snoring, and persistent fatigue.  2. The patient will wear a cardiac monitor for 2 weeks. This was discussed, and all questions were answered.  This will help to identify any potential arrhythmias that could be correlated with his syncope as well.  However he says that his dizziness/near syncope/syncope is reproducible when he lifts his hands above his head due to his previous neck injury.  3.  Patient's blood  pressure is controlled on current blood pressure medication regimen.  No medication changes are warranted at this time.  Patient advised to monitor blood pressure on a daily basis and report any persistent highs or lows.  Set goal blood pressure for patient at 130/80 or below.  4.  Informed of signs and symptoms of ACS and advised to seek emergent treatment for any new worsening symptoms.  Patient also advised sooner follow-up as needed.  Also advised to follow-up with family doctor as needed  This note is dictated utilizing voice recognition software.  Although this record has been proof read, transcriptional errors may still be present. If questions occur regarding the content of this record please do not hesitate to call our office.  I have reviewed and confirmed the accuracy of the ROS as documented by the MA/LPN/RN DONTE Lane    Return in about 3 months (around 4/5/2023).    Diagnoses and all orders for this visit:    1. Snores (Primary)  -     Home Sleep Study; Future  -     Cardiac Event Monitor; Future    2. Essential hypertension  -     Cardiac Event Monitor; Future    3. Syncope and collapse  -     Cardiac Event Monitor; Future    4. Daytime sleepiness  -     Home Sleep Study; Future  -     Cardiac Event Monitor; Future    5. Fatigue, unspecified type  -     Home Sleep Study; Future  -     Cardiac Event Monitor; Future    6. Palpitations  -     Cardiac Event Monitor; Future    Other orders  -     ECG 12 Lead        Gabino Blue  reports that he has quit smoking. He has never used smokeless tobacco.. I have educated him on the risk of diseases from using tobacco products.        MEDS ORDERED DURING VISIT:  No orders of the defined types were placed in this encounter.        This document has been electronically signed by DONTE Lane Jr.  January 5, 2023 17:57 EST     Transcribed from ambient dictation for DONTE Lane by Annie Reyna.  01/05/23   17:58 EST    Patient or patient  representative verbalized consent to the visit recording.  I have personally performed the services described in this document as transcribed by the above individual, and it is both accurate and complete.

## 2023-01-16 ENCOUNTER — TELEPHONE (OUTPATIENT)
Dept: CARDIOLOGY | Facility: CLINIC | Age: 43
End: 2023-01-16
Payer: COMMERCIAL

## 2023-01-16 RX ORDER — SILDENAFIL 25 MG/1
25 TABLET, FILM COATED ORAL DAILY PRN
Qty: 10 TABLET | Refills: 2 | Status: SHIPPED | OUTPATIENT
Start: 2023-01-16

## 2023-01-16 RX ORDER — SILDENAFIL 25 MG/1
25 TABLET, FILM COATED ORAL DAILY PRN
Qty: 10 TABLET | Refills: 2 | Status: SHIPPED | OUTPATIENT
Start: 2023-01-16 | End: 2023-01-16 | Stop reason: SDUPTHER

## 2023-01-16 NOTE — TELEPHONE ENCOUNTER
Patient seen today by PCP requesting medication for ED. With patient wearing a heart monitor, PCP called for our recommendation.     Dane Hodge APRN Cheek, [place], MA  Caller: Unspecified (Today,  1:49 PM)  I sent in sildenafil.  Make sure he knows not to combine with nitroglycerin and that he should not take nitroglycerin 24 hours before after the sildenafil.  Also informed him if he has erection longer than 4 hours he would need to seek emergent treatment.     Patient made aware and verbalized understanding. He request medication sent to [facility] as he is no longer using [facility]. Explained medication is not usually covered by insurance. He can use the [employer] chirag to check cash price.    PCP notified.

## 2023-01-26 ENCOUNTER — TELEPHONE (OUTPATIENT)
Dept: CARDIOLOGY | Facility: CLINIC | Age: 43
End: 2023-01-26
Payer: COMMERCIAL

## 2023-01-26 DIAGNOSIS — R55 SYNCOPE AND COLLAPSE: ICD-10-CM

## 2023-01-26 DIAGNOSIS — I10 ESSENTIAL HYPERTENSION: Primary | ICD-10-CM

## 2023-01-26 NOTE — TELEPHONE ENCOUNTER
----- Message from DONTE Lane sent at 1/26/2023  1:02 PM EST -----  New please inform patient that he was tachycardic 34% of time on his event monitor.  No other significant findings.  I would like for him to have labs to help identify any potential causes of the tachycardia.  I have ordered we please notify him.    I called patient and advised that him that he is tachycardic 34% of the time. I advised JR would like for him to have labs . He states that he will come by on Monday for labs.      Malena FONSECA

## 2023-01-31 ENCOUNTER — TELEPHONE (OUTPATIENT)
Dept: CARDIOLOGY | Facility: CLINIC | Age: 43
End: 2023-01-31
Payer: COMMERCIAL

## 2023-01-31 NOTE — TELEPHONE ENCOUNTER
----- Message from DONTE Lane sent at 1/30/2023 10:36 PM EST -----  Minimal PAC PVC burden.  Awaiting lab results for potential causes of his tachycardia.  He was tachycardic which means rate above 100 34% of the time with an average heart rate of 92 bpm.  Keep follow-up.    I left a detailed message for patient letting him know that he was  tachycardic 34% of the time and what that meant. I also advised that we will call him when his labs come back and we can evaluate those as well. I also advised that patient keep his follow up apt.    Malena FONSECA

## 2023-02-06 ENCOUNTER — LAB (OUTPATIENT)
Dept: LAB | Facility: HOSPITAL | Age: 43
End: 2023-02-06
Payer: COMMERCIAL

## 2023-02-06 DIAGNOSIS — I10 ESSENTIAL HYPERTENSION: ICD-10-CM

## 2023-02-06 DIAGNOSIS — R55 SYNCOPE AND COLLAPSE: ICD-10-CM

## 2023-02-06 LAB
ALBUMIN SERPL-MCNC: 4.5 G/DL (ref 3.5–5.2)
ALBUMIN/GLOB SERPL: 1.5 G/DL
ALP SERPL-CCNC: 70 U/L (ref 39–117)
ALT SERPL W P-5'-P-CCNC: 25 U/L (ref 1–41)
ANION GAP SERPL CALCULATED.3IONS-SCNC: 11.3 MMOL/L (ref 5–15)
AST SERPL-CCNC: 21 U/L (ref 1–40)
BASOPHILS # BLD AUTO: 0.02 10*3/MM3 (ref 0–0.2)
BASOPHILS NFR BLD AUTO: 0.3 % (ref 0–1.5)
BILIRUB SERPL-MCNC: 0.4 MG/DL (ref 0–1.2)
BUN SERPL-MCNC: 10 MG/DL (ref 6–20)
BUN/CREAT SERPL: 11 (ref 7–25)
CALCIUM SPEC-SCNC: 9.4 MG/DL (ref 8.6–10.5)
CHLORIDE SERPL-SCNC: 101 MMOL/L (ref 98–107)
CO2 SERPL-SCNC: 27.7 MMOL/L (ref 22–29)
CREAT SERPL-MCNC: 0.91 MG/DL (ref 0.76–1.27)
DEPRECATED RDW RBC AUTO: 48.9 FL (ref 37–54)
EGFRCR SERPLBLD CKD-EPI 2021: 107.9 ML/MIN/1.73
EOSINOPHIL # BLD AUTO: 0.14 10*3/MM3 (ref 0–0.4)
EOSINOPHIL NFR BLD AUTO: 1.8 % (ref 0.3–6.2)
ERYTHROCYTE [DISTWIDTH] IN BLOOD BY AUTOMATED COUNT: 14.5 % (ref 12.3–15.4)
GLOBULIN UR ELPH-MCNC: 3.1 GM/DL
GLUCOSE SERPL-MCNC: 85 MG/DL (ref 65–99)
HCT VFR BLD AUTO: 44 % (ref 37.5–51)
HGB BLD-MCNC: 14.4 G/DL (ref 13–17.7)
IMM GRANULOCYTES # BLD AUTO: 0.02 10*3/MM3 (ref 0–0.05)
IMM GRANULOCYTES NFR BLD AUTO: 0.3 % (ref 0–0.5)
LYMPHOCYTES # BLD AUTO: 1.87 10*3/MM3 (ref 0.7–3.1)
LYMPHOCYTES NFR BLD AUTO: 23.9 % (ref 19.6–45.3)
MAGNESIUM SERPL-MCNC: 1.9 MG/DL (ref 1.6–2.6)
MCH RBC QN AUTO: 29.6 PG (ref 26.6–33)
MCHC RBC AUTO-ENTMCNC: 32.7 G/DL (ref 31.5–35.7)
MCV RBC AUTO: 90.5 FL (ref 79–97)
MONOCYTES # BLD AUTO: 0.66 10*3/MM3 (ref 0.1–0.9)
MONOCYTES NFR BLD AUTO: 8.4 % (ref 5–12)
NEUTROPHILS NFR BLD AUTO: 5.11 10*3/MM3 (ref 1.7–7)
NEUTROPHILS NFR BLD AUTO: 65.3 % (ref 42.7–76)
NRBC BLD AUTO-RTO: 0 /100 WBC (ref 0–0.2)
PLATELET # BLD AUTO: 263 10*3/MM3 (ref 140–450)
PMV BLD AUTO: 11.1 FL (ref 6–12)
POTASSIUM SERPL-SCNC: 4.1 MMOL/L (ref 3.5–5.2)
PROT SERPL-MCNC: 7.6 G/DL (ref 6–8.5)
RBC # BLD AUTO: 4.86 10*6/MM3 (ref 4.14–5.8)
SODIUM SERPL-SCNC: 140 MMOL/L (ref 136–145)
TSH SERPL DL<=0.05 MIU/L-ACNC: 1.55 UIU/ML (ref 0.27–4.2)
WBC NRBC COR # BLD: 7.82 10*3/MM3 (ref 3.4–10.8)

## 2023-02-06 PROCEDURE — 80050 GENERAL HEALTH PANEL: CPT | Performed by: NURSE PRACTITIONER

## 2023-02-06 PROCEDURE — 83735 ASSAY OF MAGNESIUM: CPT | Performed by: NURSE PRACTITIONER

## 2023-02-06 NOTE — PROGRESS NOTES
Home sleep study was indicative of moderate sleep apnea.  Please refer to pulmonology if patient does not currently have 1.  Would recommend Theresa mcmillan if no preference.

## 2023-02-07 ENCOUNTER — TELEPHONE (OUTPATIENT)
Dept: CARDIOLOGY | Facility: CLINIC | Age: 43
End: 2023-02-07
Payer: COMMERCIAL

## 2023-02-07 DIAGNOSIS — G47.33 SLEEP APNEA, OBSTRUCTIVE: Primary | ICD-10-CM

## 2023-02-07 NOTE — TELEPHONE ENCOUNTER
LABS  Pt notified of no acute findings. Provider will discuss results at f/u. Pt reminded of appt date and time.  ----- Message from India Martines MA sent at 2/7/2023  5:14 PM EST -----    ----- Message -----  From: Dane Hodge APRN  Sent: 2/7/2023   8:24 AM EST  To: India Martines MA    Normal labs.  Keep follow-up.

## 2023-02-07 NOTE — TELEPHONE ENCOUNTER
----- Message from DONTE Lane sent at 2/6/2023 11:31 AM EST -----  Home sleep study was indicative of moderate sleep apnea.  Please refer to pulmonology if patient does not currently have 1.  Would recommend Theresa mcmillan if no preference.    I spoke with patient advised of sleep study results showing moderate sleep apnea. He would like to see Theresa Mcmillan. I will put in a referral.      Malena FONSECA

## 2023-04-14 ENCOUNTER — TELEPHONE (OUTPATIENT)
Dept: CARDIOLOGY | Facility: CLINIC | Age: 43
End: 2023-04-14
Payer: COMMERCIAL

## 2023-04-14 NOTE — TELEPHONE ENCOUNTER
For the HUB to read to pt:       Left message to confirm appt, if pt calls back please put call through, thank you

## 2023-04-25 ENCOUNTER — OFFICE VISIT (OUTPATIENT)
Dept: CARDIOLOGY | Facility: CLINIC | Age: 43
End: 2023-04-25
Payer: COMMERCIAL

## 2023-04-25 VITALS
BODY MASS INDEX: 35.43 KG/M2 | HEIGHT: 68 IN | OXYGEN SATURATION: 96 % | WEIGHT: 233.8 LBS | SYSTOLIC BLOOD PRESSURE: 127 MMHG | HEART RATE: 83 BPM | DIASTOLIC BLOOD PRESSURE: 82 MMHG

## 2023-04-25 DIAGNOSIS — I10 ESSENTIAL HYPERTENSION: Primary | ICD-10-CM

## 2023-04-25 DIAGNOSIS — R00.0 TACHYCARDIA: ICD-10-CM

## 2023-04-25 DIAGNOSIS — R55 SYNCOPE AND COLLAPSE: ICD-10-CM

## 2023-04-25 PROCEDURE — 99214 OFFICE O/P EST MOD 30 MIN: CPT | Performed by: NURSE PRACTITIONER

## 2023-04-25 RX ORDER — ATENOLOL 25 MG/1
25 TABLET ORAL DAILY
Qty: 30 TABLET | Refills: 6 | Status: SHIPPED | OUTPATIENT
Start: 2023-04-25

## 2023-04-25 NOTE — LETTER
April 30, 2023       No Recipients    Patient: Gabino Blue   YOB: 1980   Date of Visit: 4/25/2023       Dear DONTE Whatley    Gabino Blue was in my office today. Below is a copy of my note.    If you have questions, please do not hesitate to call me. I look forward to following Gabino along with you.         Sincerely,        DONTE Lane        CC:   No Recipients    Subjective      Gabino Blue is a 42 y.o. male who presents to [facility] for testing follow up  (Cardiac monitor).    CHIEF COMPLIANT  Chief Complaint   Patient presents with   • testing follow up      Cardiac monitor       Active Problems:  Problem List Items Addressed This Visit    None  Visit Diagnoses       Essential hypertension    -  Primary    Relevant Medications    atenolol (TENORMIN) 25 MG tablet    Syncope and collapse        Tachycardia            Problem List :  1.Precordial pain  2.Shortness of breath  3.Essential hypertension  4.  Cardiac event monitor 1/23: None or accidental pushes occurred during sinus tachycardia ranging anywhere from 1 15-1 23.  1 episode occurred during sinus rhythm.  No ectopy was noted.  Minimal PAC PVC burden.  Patient tachycardic 34% of the time    HPI  HPI     Gabino Blue is a 42-year-old male patient being followed up today for chronic arterial hypertension, which he is on lisinopril-hydrochlorothiazide. Today, his blood pressure is 127/82 mmHg, heart rate of 83 beats per minute. He is on atorvastatin for hyperlipidemia. He did go under a recent cardiac event monitor, which identified a minimal PAC, PVC burden. Accidental pushes occurred during a sinus tachycardia ranging from 115 to 123 beats per minute. One episode occurred during a normal sinus rhythm with a rate of 87 beats per minute. No noted ectopy. Patient tachycardic 34 percent of the time with an average heart rate of 92 beats per minute and a maximum heart rate of 162 beats per minute, 155 beats per minute at hours of  sleep.    The patient denies angina or dyspnea.     He worked 3rd shift and was awake overnight. He no longer works 3rd shift. He adds he resigned between jobs.    The patient confirmed he was using a CPAP machine. He adds his machine was taken back due to insurance. He felt a little better when he was wearing it.    He has noticed edema in his lower extremities when he is on his feet a lot.    He is back on Zoloft 50 mg. He has been under a lot of stress recently. He adds he went through a divorce last year.    He recently had blood work completed.    The patient denies syncope or near syncope.  He denies any chest pain, shortness of breath, lower extremity edema, palpitations, fatigue, syncope, PND, orthopnea, or strokelike symptoms.    PRIOR MEDS  Current Outpatient Medications on File Prior to Visit   Medication Sig Dispense Refill   • atorvastatin (LIPITOR) 10 MG tablet Take 1 tablet by mouth Daily.     • cyclobenzaprine (FLEXERIL) 5 MG tablet Take 1 tablet by mouth Daily.     • DICLOFENAC PO Take 50 mg by mouth 2 (Two) Times a Day As Needed.     • Dulaglutide 1.5 MG/0.5ML solution pen-injector Inject 1.5 mg under the skin into the appropriate area as directed 1 (One) Time Per Week.     • lisinopril (PRINIVIL,ZESTRIL) 5 MG tablet Take 1 tablet by mouth Daily.     • Magnesium Oxide 400 (240 Mg) MG tablet Take 1 tablet by mouth Daily.     • multivitamin (THERAGRAN) tablet tablet Take  by mouth Daily.     • sertraline (ZOLOFT) 50 MG tablet Take 1 tablet by mouth Daily.     • sildenafil (VIAGRA) 25 MG tablet Take 1 tablet by mouth Daily As Needed for Erectile Dysfunction. 10 tablet 2     No current facility-administered medications on file prior to visit.       ALLERGIES  Patient has no known allergies.    HISTORY  Past Medical History:   Diagnosis Date   • Hyperlipidemia    • Hypertension        Social History     Socioeconomic History   • Marital status:    Tobacco Use   • Smoking status: Former   •  "Smokeless tobacco: Never   Substance and Sexual Activity   • Alcohol use: Yes     Comment: social    • Drug use: Never   • Sexual activity: Defer       Family History   Problem Relation Age of Onset   • Hypertension Mother    • Heart disease Mother    • Cancer Father        Review of Systems   Constitutional: Negative.  Negative for chills, fatigue and fever.   HENT: Negative.  Negative for congestion, rhinorrhea and sore throat.    Respiratory: Positive for apnea (had to turn in C PAP). Negative for chest tightness and shortness of breath.    Cardiovascular: Negative.  Negative for chest pain, palpitations and leg swelling.   Gastrointestinal: Negative.  Negative for constipation, diarrhea and nausea.   Musculoskeletal: Positive for arthralgias (tendonitis elbows) and neck pain. Negative for back pain.   Allergic/Immunologic: Positive for environmental allergies. Negative for food allergies.   Neurological: Positive for dizziness (bending over getting up to quick) and light-headedness. Negative for syncope and weakness.   Hematological: Does not bruise/bleed easily.   Psychiatric/Behavioral: Negative for sleep disturbance.       Objective      VITALS: /82 (BP Location: Left arm, Patient Position: Sitting, Cuff Size: Adult)   Pulse 83   Ht 172.7 cm (67.99\")   Wt 106 kg (233 lb 12.8 oz)   SpO2 96%   BMI 35.56 kg/m²     LABS:   Lab Results (most recent)       None            IMAGING:   No Images in the past 120 days found..    EXAM:  Physical Exam  Vitals and nursing note reviewed.   Constitutional:       Appearance: He is well-developed.   HENT:      Head: Normocephalic and atraumatic.   Eyes:      Pupils: Pupils are equal, round, and reactive to light.   Neck:      Vascular: No carotid bruit or JVD.   Cardiovascular:      Rate and Rhythm: Normal rate and regular rhythm.      Pulses:           Carotid pulses are 2+ on the right side and 2+ on the left side.       Radial pulses are 2+ on the right side and " 2+ on the left side.        Posterior tibial pulses are 2+ on the right side and 2+ on the left side.      Heart sounds: Normal heart sounds. No murmur heard.    No gallop.   Pulmonary:      Effort: Pulmonary effort is normal. No respiratory distress.      Breath sounds: Normal breath sounds.   Abdominal:      General: Bowel sounds are normal. There is no distension.      Palpations: Abdomen is soft.      Tenderness: There is no abdominal tenderness.   Musculoskeletal:         General: No swelling. Normal range of motion.      Cervical back: Neck supple.   Skin:     General: Skin is warm and dry.   Neurological:      Mental Status: He is alert and oriented to person, place, and time.      Cranial Nerves: No cranial nerve deficit.      Sensory: No sensory deficit.   Psychiatric:         Speech: Speech normal.         Behavior: Behavior normal.         Thought Content: Thought content normal.         Judgment: Judgment normal.         Procedure    Procedures      Assessment & Plan    Diagnosis Plan   1. Essential hypertension        2. Syncope and collapse        3. Tachycardia        1.  Patient's blood pressure is controlled on current blood pressure medication regimen.  No medication changes are warranted at this time.  Patient advised to monitor blood pressure on a daily basis and report any persistent highs or lows.  Set goal blood pressure for patient at 130/80 or below.  2.  Patient does have a history of syncope in which we did have her wear a cardiac event monitor.  That showed no indications of potential causes of his syncope.  We will continue to monitor at this time.  3.  Patient does have tachycardia which we will start him on atenolol see if we can better control his heart rate.  4.  Informed of signs and symptoms of ACS and advised to seek emergent treatment for any new worsening symptoms.  Patient also advised sooner follow-up as needed.  Also advised to follow-up with family doctor as needed  This  note is dictated utilizing voice recognition software.  Although this record has been proof read, transcriptional errors may still be present. If questions occur regarding the content of this record please do not hesitate to call our office.  I have reviewed and confirmed the accuracy of the ROS as documented by the MA/LPN/RN DONTE Lane    Return in about 6 months (around 10/25/2023), or if symptoms worsen or fail to improve.    Diagnoses and all orders for this visit:    1. Essential hypertension (Primary)    2. Syncope and collapse    3. Tachycardia    Other orders  -     atenolol (TENORMIN) 25 MG tablet; Take 1 tablet by mouth Daily.  Dispense: 30 tablet; Refill: 6        Gabino Blue  reports that he has quit smoking. He has never used smokeless tobacco.. I have educated him on the risk of diseases from using tobacco products.       MEDS ORDERED DURING VISIT:  New Medications Ordered This Visit   Medications   • atenolol (TENORMIN) 25 MG tablet     Sig: Take 1 tablet by mouth Daily.     Dispense:  30 tablet     Refill:  6           This document has been electronically signed by DONTE Lane Jr.  April 30, 2023 22:30 EDT      Transcribed from ambient dictation for DONTE Lane by Karen Henning [employer] .  04/25/23   19:03 EDT    Patient or patient representative verbalized consent to the visit recording.  I have personally performed the services described in this document as transcribed by the above individual, and it is both accurate and complete.

## 2023-04-25 NOTE — PROGRESS NOTES
Subjective     Gabino Blue is a 42 y.o. male who presents to day for testing follow up  (Cardiac monitor).    CHIEF COMPLIANT  Chief Complaint   Patient presents with   • testing follow up      Cardiac monitor       Active Problems:  Problem List Items Addressed This Visit    None  Visit Diagnoses     Essential hypertension    -  Primary    Relevant Medications    atenolol (TENORMIN) 25 MG tablet    Syncope and collapse        Tachycardia          Problem List :  1.Precordial pain  2.Shortness of breath  3.Essential hypertension  4.  Cardiac event monitor 1/23: None or accidental pushes occurred during sinus tachycardia ranging anywhere from 1 15-1 23.  1 episode occurred during sinus rhythm.  No ectopy was noted.  Minimal PAC PVC burden.  Patient tachycardic 34% of the time    HPI  GALLITO Blue is a 42-year-old male patient being followed up today for chronic arterial hypertension, which he is on lisinopril-hydrochlorothiazide. Today, his blood pressure is 127/82 mmHg, heart rate of 83 beats per minute. He is on atorvastatin for hyperlipidemia. He did go under a recent cardiac event monitor, which identified a minimal PAC, PVC burden. Accidental pushes occurred during a sinus tachycardia ranging from 115 to 123 beats per minute. One episode occurred during a normal sinus rhythm with a rate of 87 beats per minute. No noted ectopy. Patient tachycardic 34 percent of the time with an average heart rate of 92 beats per minute and a maximum heart rate of 162 beats per minute, 155 beats per minute at hours of sleep.    The patient denies angina or dyspnea.     He worked 3rd shift and was awake overnight. He no longer works 3rd shift. He adds he resigned between jobs.    The patient confirmed he was using a CPAP machine. He adds his machine was taken back due to insurance. He felt a little better when he was wearing it.    He has noticed edema in his lower extremities when he is on his feet a lot.    He is back on  Zoloft 50 mg. He has been under a lot of stress recently. He adds he went through a divorce last year.    He recently had blood work completed.    The patient denies syncope or near syncope.  He denies any chest pain, shortness of breath, lower extremity edema, palpitations, fatigue, syncope, PND, orthopnea, or strokelike symptoms.    PRIOR MEDS  Current Outpatient Medications on File Prior to Visit   Medication Sig Dispense Refill   • atorvastatin (LIPITOR) 10 MG tablet Take 1 tablet by mouth Daily.     • cyclobenzaprine (FLEXERIL) 5 MG tablet Take 1 tablet by mouth Daily.     • DICLOFENAC PO Take 50 mg by mouth 2 (Two) Times a Day As Needed.     • Dulaglutide 1.5 MG/0.5ML solution pen-injector Inject 1.5 mg under the skin into the appropriate area as directed 1 (One) Time Per Week.     • lisinopril (PRINIVIL,ZESTRIL) 5 MG tablet Take 1 tablet by mouth Daily.     • Magnesium Oxide 400 (240 Mg) MG tablet Take 1 tablet by mouth Daily.     • multivitamin (THERAGRAN) tablet tablet Take  by mouth Daily.     • sertraline (ZOLOFT) 50 MG tablet Take 1 tablet by mouth Daily.     • sildenafil (VIAGRA) 25 MG tablet Take 1 tablet by mouth Daily As Needed for Erectile Dysfunction. 10 tablet 2     No current facility-administered medications on file prior to visit.       ALLERGIES  Patient has no known allergies.    HISTORY  Past Medical History:   Diagnosis Date   • Hyperlipidemia    • Hypertension        Social History     Socioeconomic History   • Marital status:    Tobacco Use   • Smoking status: Former   • Smokeless tobacco: Never   Substance and Sexual Activity   • Alcohol use: Yes     Comment: social    • Drug use: Never   • Sexual activity: Defer       Family History   Problem Relation Age of Onset   • Hypertension Mother    • Heart disease Mother    • Cancer Father        Review of Systems   Constitutional: Negative.  Negative for chills, fatigue and fever.   HENT: Negative.  Negative for congestion, rhinorrhea  "and sore throat.    Respiratory: Positive for apnea (had to turn in C PAP). Negative for chest tightness and shortness of breath.    Cardiovascular: Negative.  Negative for chest pain, palpitations and leg swelling.   Gastrointestinal: Negative.  Negative for constipation, diarrhea and nausea.   Musculoskeletal: Positive for arthralgias (tendonitis elbows) and neck pain. Negative for back pain.   Allergic/Immunologic: Positive for environmental allergies. Negative for food allergies.   Neurological: Positive for dizziness (bending over getting up to quick) and light-headedness. Negative for syncope and weakness.   Hematological: Does not bruise/bleed easily.   Psychiatric/Behavioral: Negative for sleep disturbance.       Objective     VITALS: /82 (BP Location: Left arm, Patient Position: Sitting, Cuff Size: Adult)   Pulse 83   Ht 172.7 cm (67.99\")   Wt 106 kg (233 lb 12.8 oz)   SpO2 96%   BMI 35.56 kg/m²     LABS:   Lab Results (most recent)     None          IMAGING:   No Images in the past 120 days found..    EXAM:  Physical Exam  Vitals and nursing note reviewed.   Constitutional:       Appearance: He is well-developed.   HENT:      Head: Normocephalic and atraumatic.   Eyes:      Pupils: Pupils are equal, round, and reactive to light.   Neck:      Vascular: No carotid bruit or JVD.   Cardiovascular:      Rate and Rhythm: Normal rate and regular rhythm.      Pulses:           Carotid pulses are 2+ on the right side and 2+ on the left side.       Radial pulses are 2+ on the right side and 2+ on the left side.        Posterior tibial pulses are 2+ on the right side and 2+ on the left side.      Heart sounds: Normal heart sounds. No murmur heard.    No gallop.   Pulmonary:      Effort: Pulmonary effort is normal. No respiratory distress.      Breath sounds: Normal breath sounds.   Abdominal:      General: Bowel sounds are normal. There is no distension.      Palpations: Abdomen is soft.      Tenderness: " There is no abdominal tenderness.   Musculoskeletal:         General: No swelling. Normal range of motion.      Cervical back: Neck supple.   Skin:     General: Skin is warm and dry.   Neurological:      Mental Status: He is alert and oriented to person, place, and time.      Cranial Nerves: No cranial nerve deficit.      Sensory: No sensory deficit.   Psychiatric:         Speech: Speech normal.         Behavior: Behavior normal.         Thought Content: Thought content normal.         Judgment: Judgment normal.         Procedure   Procedures       Assessment & Plan    Diagnosis Plan   1. Essential hypertension        2. Syncope and collapse        3. Tachycardia        1.  Patient's blood pressure is controlled on current blood pressure medication regimen.  No medication changes are warranted at this time.  Patient advised to monitor blood pressure on a daily basis and report any persistent highs or lows.  Set goal blood pressure for patient at 130/80 or below.  2.  Patient does have a history of syncope in which we did have her wear a cardiac event monitor.  That showed no indications of potential causes of his syncope.  We will continue to monitor at this time.  3.  Patient does have tachycardia which we will start him on atenolol see if we can better control his heart rate.  4.  Informed of signs and symptoms of ACS and advised to seek emergent treatment for any new worsening symptoms.  Patient also advised sooner follow-up as needed.  Also advised to follow-up with family doctor as needed  This note is dictated utilizing voice recognition software.  Although this record has been proof read, transcriptional errors may still be present. If questions occur regarding the content of this record please do not hesitate to call our office.  I have reviewed and confirmed the accuracy of the ROS as documented by the MA/NATY/RN DONTE Lane    Return in about 6 months (around 10/25/2023), or if symptoms worsen or  fail to improve.    Diagnoses and all orders for this visit:    1. Essential hypertension (Primary)    2. Syncope and collapse    3. Tachycardia    Other orders  -     atenolol (TENORMIN) 25 MG tablet; Take 1 tablet by mouth Daily.  Dispense: 30 tablet; Refill: 6        Gabino Blue  reports that he has quit smoking. He has never used smokeless tobacco.. I have educated him on the risk of diseases from using tobacco products.        MEDS ORDERED DURING VISIT:  New Medications Ordered This Visit   Medications   • atenolol (TENORMIN) 25 MG tablet     Sig: Take 1 tablet by mouth Daily.     Dispense:  30 tablet     Refill:  6           This document has been electronically signed by DONTE Lane Jr.  April 30, 2023 22:30 EDT      Transcribed from ambient dictation for DONTE Lane by Karen Henning [employer] .  04/25/23   19:03 EDT    Patient or patient representative verbalized consent to the visit recording.  I have personally performed the services described in this document as transcribed by the above individual, and it is both accurate and complete.

## 2023-11-08 ENCOUNTER — OFFICE VISIT (OUTPATIENT)
Dept: CARDIOLOGY | Facility: CLINIC | Age: 43
End: 2023-11-08
Payer: COMMERCIAL

## 2023-11-08 VITALS
OXYGEN SATURATION: 96 % | HEIGHT: 68 IN | SYSTOLIC BLOOD PRESSURE: 138 MMHG | WEIGHT: 256 LBS | BODY MASS INDEX: 38.8 KG/M2 | HEART RATE: 79 BPM | DIASTOLIC BLOOD PRESSURE: 80 MMHG

## 2023-11-08 DIAGNOSIS — I10 ESSENTIAL HYPERTENSION: Primary | ICD-10-CM

## 2023-11-08 DIAGNOSIS — G47.33 SLEEP APNEA, OBSTRUCTIVE: ICD-10-CM

## 2023-11-08 DIAGNOSIS — R00.0 TACHYCARDIA: ICD-10-CM

## 2023-11-08 RX ORDER — CHLORAL HYDRATE 500 MG
CAPSULE ORAL
COMMUNITY

## 2023-11-08 NOTE — PROGRESS NOTES
Subjective     Gabino Blue is a 42 y.o. male who presents today for Follow-up and Hypertension.    CHIEF COMPLIANT  Chief Complaint   Patient presents with    Follow-up    Hypertension       Active Problems:  Problem List :  1.Precordial pain  2.Shortness of breath  3.Essential hypertension  4. Cardiac event monitor 1/23: None or accidental pushes occurred during sinus tachycardia ranging anywhere from 115-123.  1 episode occurred during sinus rhythm.  No ectopy was noted.  Minimal PAC PVC burden.  Patient tachycardic 34% of the time    HPI  The patient is a 42-year-old male who returns for routine follow-up.  The patient denies chest pain, shortness of air, syncope, near syncope, palpitations, orthopnea or PND.  He states his blood pressure has been under good control.  He has recently obtained his CPAP and does have a follow-up with pulmonary tomorrow.  Overall he states he is doing well.  His blood pressure is 138/80.    PRIOR MEDS  Current Outpatient Medications on File Prior to Visit   Medication Sig Dispense Refill    atenolol (TENORMIN) 25 MG tablet Take 1 tablet by mouth Daily. 30 tablet 6    atorvastatin (LIPITOR) 10 MG tablet Take 1 tablet by mouth Daily.      cyclobenzaprine (FLEXERIL) 5 MG tablet Take 1 tablet by mouth Daily.      DICLOFENAC PO Take 50 mg by mouth 2 (Two) Times a Day As Needed.      lisinopril (PRINIVIL,ZESTRIL) 5 MG tablet Take 1 tablet by mouth Daily.      Magnesium Oxide 400 (240 Mg) MG tablet Take 1 tablet by mouth Daily.      multivitamin (THERAGRAN) tablet tablet Take  by mouth Daily.      Omega-3 Fatty Acids (fish oil) 1000 MG capsule capsule Take  by mouth Daily With Breakfast.      sertraline (ZOLOFT) 50 MG tablet Take 1 tablet by mouth Daily.      sildenafil (VIAGRA) 25 MG tablet Take 1 tablet by mouth Daily As Needed for Erectile Dysfunction. 10 tablet 2    metFORMIN (GLUCOPHAGE) 500 MG tablet Take 1 tablet by mouth Daily With Breakfast.      [DISCONTINUED] Dulaglutide 1.5  MG/0.5ML solution pen-injector Inject 1.5 mg under the skin into the appropriate area as directed 1 (One) Time Per Week.       No current facility-administered medications on file prior to visit.       ALLERGIES  Patient has no known allergies.    HISTORY  Past Medical History:   Diagnosis Date    Hyperlipidemia     Hypertension        Social History     Socioeconomic History    Marital status:    Tobacco Use    Smoking status: Former    Smokeless tobacco: Never   Substance and Sexual Activity    Alcohol use: Yes     Comment: social     Drug use: Never    Sexual activity: Defer       Family History   Problem Relation Age of Onset    Hypertension Mother     Heart disease Mother     Cancer Father        Review of Systems   Constitutional:  Positive for fatigue (in legs). Negative for chills and diaphoresis.   HENT:  Positive for ear discharge.    Eyes: Negative.  Negative for visual disturbance.   Respiratory:  Positive for apnea (uses cpap). Negative for cough, chest tightness, shortness of breath and wheezing.    Cardiovascular: Negative.  Negative for chest pain, palpitations and leg swelling.   Gastrointestinal:  Positive for blood in stool (due to hemmroids).   Endocrine: Negative.  Negative for cold intolerance and heat intolerance.   Genitourinary: Negative.  Negative for hematuria.   Musculoskeletal:  Positive for arthralgias (back and knees), back pain, neck pain and neck stiffness. Negative for myalgias.        Restless legs   Skin: Negative.  Negative for color change, rash and wound.   Allergic/Immunologic: Negative.  Negative for environmental allergies and food allergies.   Neurological:  Positive for dizziness (when Blood sugars drops), light-headedness and numbness (hands). Negative for syncope, weakness and headaches.   Hematological:  Bruises/bleeds easily.   Psychiatric/Behavioral: Negative.  Negative for sleep disturbance.        Objective     VITALS: /80 (BP Location: Left arm,  "Patient Position: Sitting)   Pulse 79   Ht 172.7 cm (67.99\")   Wt 116 kg (256 lb)   SpO2 96%   BMI 38.93 kg/m²     LABS:   Lab Results (most recent)       None            IMAGING:   No Images in the past 120 days found..    EXAM:  Physical Exam  Constitutional:       Appearance: Normal appearance.   Eyes:      Pupils: Pupils are equal, round, and reactive to light.   Cardiovascular:      Rate and Rhythm: Normal rate and regular rhythm.      Pulses:           Carotid pulses are 2+ on the right side and 2+ on the left side.       Radial pulses are 2+ on the right side and 2+ on the left side.        Dorsalis pedis pulses are 2+ on the right side and 2+ on the left side.        Posterior tibial pulses are 2+ on the right side and 2+ on the left side.      Heart sounds: Normal heart sounds.   Pulmonary:      Effort: Pulmonary effort is normal.      Breath sounds: Normal breath sounds.   Abdominal:      General: Bowel sounds are normal.      Palpations: Abdomen is soft.   Musculoskeletal:      Right lower leg: No edema.      Left lower leg: No edema.   Skin:     General: Skin is warm and dry.      Capillary Refill: Capillary refill takes less than 2 seconds.   Neurological:      General: No focal deficit present.      Mental Status: He is alert and oriented to person, place, and time.   Psychiatric:         Mood and Affect: Mood normal.         Thought Content: Thought content normal.         Procedure   Procedures       Assessment & Plan    Diagnosis Plan   1. Essential hypertension        2. Sleep apnea, obstructive        3. Tachycardia          Plan:  1.  Essential hypertension: Blood pressure currently under good control.  We will continue current medication regimen including atenolol, lisinopril.  The patient was instructed to monitor BP at home and to notify our office if systolic BP is consistently greater than 130-135 systolic.  Goal BP is 130-135/70-80.    2.  Obstructive sleep apnea: The patient has " recently received his CPAP and is started using it.  He does have a follow-up with pulmonary tomorrow.  We did review the importance of compliance with CPAP and the effects that untreated sleep apnea can have on the body.  3.  Tachycardia: The patient's heart rate is under better control.  He denies further palpitations.  We will continue atenolol.    Return in about 1 year (around 11/8/2024).    Patient did not bring med list or medicine bottles to appointment, med list has been reviewed and updated based on patient's knowledge of their meds.         MEDS ORDERED DURING VISIT:  No orders of the defined types were placed in this encounter.      DISCONTINUED MEDS DURING VISIT:   Medications Discontinued During This Encounter   Medication Reason    Dulaglutide 1.5 MG/0.5ML solution pen-injector Patient Reported Not Taking          This document has been electronically signed by DONTE Craig  November 8, 2023 19:21 EST    Dictated Utilizing Dragon Dictation: Part of this note may be an electronic transcription/translation of spoken language to printed text using the Dragon Dictation System

## 2024-01-29 RX ORDER — ATENOLOL 25 MG/1
25 TABLET ORAL DAILY
Qty: 30 TABLET | Refills: 11 | Status: SHIPPED | OUTPATIENT
Start: 2024-01-29 | End: 2024-01-30 | Stop reason: SDUPTHER

## 2024-01-30 RX ORDER — ATENOLOL 25 MG/1
25 TABLET ORAL DAILY
Qty: 30 TABLET | Refills: 11 | Status: SHIPPED | OUTPATIENT
Start: 2024-01-30

## 2024-02-06 RX ORDER — ATENOLOL 25 MG/1
25 TABLET ORAL DAILY
Qty: 30 TABLET | Refills: 11 | Status: SHIPPED | OUTPATIENT
Start: 2024-02-06

## 2024-11-06 ENCOUNTER — TELEPHONE (OUTPATIENT)
Dept: CARDIOLOGY | Facility: CLINIC | Age: 44
End: 2024-11-06
Payer: COMMERCIAL